# Patient Record
Sex: FEMALE | Race: OTHER | HISPANIC OR LATINO
[De-identification: names, ages, dates, MRNs, and addresses within clinical notes are randomized per-mention and may not be internally consistent; named-entity substitution may affect disease eponyms.]

---

## 2021-01-29 ENCOUNTER — TRANSCRIPTION ENCOUNTER (OUTPATIENT)
Age: 51
End: 2021-01-29

## 2021-01-29 ENCOUNTER — INPATIENT (INPATIENT)
Facility: HOSPITAL | Age: 51
LOS: 1 days | Discharge: HOME | DRG: 585 | End: 2021-01-31
Attending: SURGERY | Admitting: SURGERY
Payer: COMMERCIAL

## 2021-01-29 VITALS
SYSTOLIC BLOOD PRESSURE: 201 MMHG | RESPIRATION RATE: 18 BRPM | OXYGEN SATURATION: 100 % | DIASTOLIC BLOOD PRESSURE: 100 MMHG | HEART RATE: 112 BPM | WEIGHT: 179.9 LBS | HEIGHT: 65 IN | TEMPERATURE: 98 F

## 2021-01-29 DIAGNOSIS — N61.1 ABSCESS OF THE BREAST AND NIPPLE: ICD-10-CM

## 2021-01-29 LAB
ALBUMIN SERPL ELPH-MCNC: 3.7 G/DL — SIGNIFICANT CHANGE UP (ref 3.5–5)
ALP SERPL-CCNC: 83 U/L — SIGNIFICANT CHANGE UP (ref 40–120)
ALT FLD-CCNC: 16 U/L DA — SIGNIFICANT CHANGE UP (ref 10–60)
ANION GAP SERPL CALC-SCNC: 6 MMOL/L — SIGNIFICANT CHANGE UP (ref 5–17)
APPEARANCE UR: CLEAR — SIGNIFICANT CHANGE UP
APTT BLD: 30.5 SEC — SIGNIFICANT CHANGE UP (ref 27.5–35.5)
AST SERPL-CCNC: 5 U/L — LOW (ref 10–40)
BASOPHILS # BLD AUTO: 0.05 K/UL — SIGNIFICANT CHANGE UP (ref 0–0.2)
BASOPHILS NFR BLD AUTO: 0.5 % — SIGNIFICANT CHANGE UP (ref 0–2)
BILIRUB SERPL-MCNC: 0.2 MG/DL — SIGNIFICANT CHANGE UP (ref 0.2–1.2)
BILIRUB UR-MCNC: NEGATIVE — SIGNIFICANT CHANGE UP
BUN SERPL-MCNC: 12 MG/DL — SIGNIFICANT CHANGE UP (ref 7–18)
CALCIUM SERPL-MCNC: 8.4 MG/DL — SIGNIFICANT CHANGE UP (ref 8.4–10.5)
CHLORIDE SERPL-SCNC: 104 MMOL/L — SIGNIFICANT CHANGE UP (ref 96–108)
CK MB BLD-MCNC: <1.6 % — SIGNIFICANT CHANGE UP (ref 0–3.5)
CK MB CFR SERPL CALC: <1 NG/ML — SIGNIFICANT CHANGE UP (ref 0–3.6)
CK SERPL-CCNC: 62 U/L — SIGNIFICANT CHANGE UP (ref 21–215)
CO2 SERPL-SCNC: 28 MMOL/L — SIGNIFICANT CHANGE UP (ref 22–31)
COLOR SPEC: YELLOW — SIGNIFICANT CHANGE UP
CREAT SERPL-MCNC: 0.94 MG/DL — SIGNIFICANT CHANGE UP (ref 0.5–1.3)
DIFF PNL FLD: ABNORMAL
EOSINOPHIL # BLD AUTO: 0.06 K/UL — SIGNIFICANT CHANGE UP (ref 0–0.5)
EOSINOPHIL NFR BLD AUTO: 0.5 % — SIGNIFICANT CHANGE UP (ref 0–6)
GLUCOSE SERPL-MCNC: 133 MG/DL — HIGH (ref 70–99)
GLUCOSE UR QL: NEGATIVE — SIGNIFICANT CHANGE UP
HCT VFR BLD CALC: 36.9 % — SIGNIFICANT CHANGE UP (ref 34.5–45)
HGB BLD-MCNC: 11.8 G/DL — SIGNIFICANT CHANGE UP (ref 11.5–15.5)
IMM GRANULOCYTES NFR BLD AUTO: 0.4 % — SIGNIFICANT CHANGE UP (ref 0–1.5)
INR BLD: 1.09 RATIO — SIGNIFICANT CHANGE UP (ref 0.88–1.16)
KETONES UR-MCNC: NEGATIVE — SIGNIFICANT CHANGE UP
LACTATE SERPL-SCNC: 1.5 MMOL/L — SIGNIFICANT CHANGE UP (ref 0.7–2)
LEUKOCYTE ESTERASE UR-ACNC: NEGATIVE — SIGNIFICANT CHANGE UP
LYMPHOCYTES # BLD AUTO: 0.96 K/UL — LOW (ref 1–3.3)
LYMPHOCYTES # BLD AUTO: 8.7 % — LOW (ref 13–44)
MCHC RBC-ENTMCNC: 28.9 PG — SIGNIFICANT CHANGE UP (ref 27–34)
MCHC RBC-ENTMCNC: 32 GM/DL — SIGNIFICANT CHANGE UP (ref 32–36)
MCV RBC AUTO: 90.2 FL — SIGNIFICANT CHANGE UP (ref 80–100)
MONOCYTES # BLD AUTO: 0.58 K/UL — SIGNIFICANT CHANGE UP (ref 0–0.9)
MONOCYTES NFR BLD AUTO: 5.3 % — SIGNIFICANT CHANGE UP (ref 2–14)
NEUTROPHILS # BLD AUTO: 9.29 K/UL — HIGH (ref 1.8–7.4)
NEUTROPHILS NFR BLD AUTO: 84.6 % — HIGH (ref 43–77)
NITRITE UR-MCNC: NEGATIVE — SIGNIFICANT CHANGE UP
NRBC # BLD: 0 /100 WBCS — SIGNIFICANT CHANGE UP (ref 0–0)
PH UR: 6 — SIGNIFICANT CHANGE UP (ref 5–8)
PLATELET # BLD AUTO: 382 K/UL — SIGNIFICANT CHANGE UP (ref 150–400)
POTASSIUM SERPL-MCNC: 3.5 MMOL/L — SIGNIFICANT CHANGE UP (ref 3.5–5.3)
POTASSIUM SERPL-SCNC: 3.5 MMOL/L — SIGNIFICANT CHANGE UP (ref 3.5–5.3)
PROT SERPL-MCNC: 7.7 G/DL — SIGNIFICANT CHANGE UP (ref 6–8.3)
PROT UR-MCNC: NEGATIVE — SIGNIFICANT CHANGE UP
PROTHROM AB SERPL-ACNC: 12.9 SEC — SIGNIFICANT CHANGE UP (ref 10.6–13.6)
RBC # BLD: 4.09 M/UL — SIGNIFICANT CHANGE UP (ref 3.8–5.2)
RBC # FLD: 13.4 % — SIGNIFICANT CHANGE UP (ref 10.3–14.5)
SARS-COV-2 RNA SPEC QL NAA+PROBE: SIGNIFICANT CHANGE UP
SODIUM SERPL-SCNC: 138 MMOL/L — SIGNIFICANT CHANGE UP (ref 135–145)
SP GR SPEC: 1.01 — SIGNIFICANT CHANGE UP (ref 1.01–1.02)
TROPONIN I SERPL-MCNC: <0.015 NG/ML — SIGNIFICANT CHANGE UP (ref 0–0.04)
UROBILINOGEN FLD QL: NEGATIVE — SIGNIFICANT CHANGE UP
WBC # BLD: 10.98 K/UL — HIGH (ref 3.8–10.5)
WBC # FLD AUTO: 10.98 K/UL — HIGH (ref 3.8–10.5)

## 2021-01-29 PROCEDURE — 99285 EMERGENCY DEPT VISIT HI MDM: CPT

## 2021-01-29 PROCEDURE — 76642 ULTRASOUND BREAST LIMITED: CPT | Mod: 26,LT

## 2021-01-29 PROCEDURE — 99222 1ST HOSP IP/OBS MODERATE 55: CPT | Mod: 57

## 2021-01-29 RX ORDER — MORPHINE SULFATE 50 MG/1
2 CAPSULE, EXTENDED RELEASE ORAL ONCE
Refills: 0 | Status: DISCONTINUED | OUTPATIENT
Start: 2021-01-29 | End: 2021-01-29

## 2021-01-29 RX ORDER — ONDANSETRON 8 MG/1
4 TABLET, FILM COATED ORAL EVERY 6 HOURS
Refills: 0 | Status: DISCONTINUED | OUTPATIENT
Start: 2021-01-29 | End: 2021-01-30

## 2021-01-29 RX ORDER — ACETAMINOPHEN 500 MG
650 TABLET ORAL ONCE
Refills: 0 | Status: COMPLETED | OUTPATIENT
Start: 2021-01-29 | End: 2021-01-29

## 2021-01-29 RX ORDER — LABETALOL HCL 100 MG
5 TABLET ORAL ONCE
Refills: 0 | Status: COMPLETED | OUTPATIENT
Start: 2021-01-29 | End: 2021-01-29

## 2021-01-29 RX ORDER — SODIUM CHLORIDE 9 MG/ML
2000 INJECTION INTRAMUSCULAR; INTRAVENOUS; SUBCUTANEOUS ONCE
Refills: 0 | Status: COMPLETED | OUTPATIENT
Start: 2021-01-29 | End: 2021-01-29

## 2021-01-29 RX ORDER — SODIUM CHLORIDE 9 MG/ML
1000 INJECTION INTRAMUSCULAR; INTRAVENOUS; SUBCUTANEOUS
Refills: 0 | Status: DISCONTINUED | OUTPATIENT
Start: 2021-01-29 | End: 2021-01-30

## 2021-01-29 RX ORDER — LOSARTAN POTASSIUM 100 MG/1
25 TABLET, FILM COATED ORAL DAILY
Refills: 0 | Status: DISCONTINUED | OUTPATIENT
Start: 2021-01-30 | End: 2021-01-30

## 2021-01-29 RX ORDER — OXYCODONE AND ACETAMINOPHEN 5; 325 MG/1; MG/1
1 TABLET ORAL EVERY 6 HOURS
Refills: 0 | Status: DISCONTINUED | OUTPATIENT
Start: 2021-01-29 | End: 2021-01-30

## 2021-01-29 RX ORDER — HEPARIN SODIUM 5000 [USP'U]/ML
5000 INJECTION INTRAVENOUS; SUBCUTANEOUS EVERY 8 HOURS
Refills: 0 | Status: DISCONTINUED | OUTPATIENT
Start: 2021-01-29 | End: 2021-01-30

## 2021-01-29 RX ORDER — KETOROLAC TROMETHAMINE 30 MG/ML
15 SYRINGE (ML) INJECTION EVERY 6 HOURS
Refills: 0 | Status: DISCONTINUED | OUTPATIENT
Start: 2021-01-29 | End: 2021-01-30

## 2021-01-29 RX ORDER — ACETAMINOPHEN 500 MG
650 TABLET ORAL EVERY 6 HOURS
Refills: 0 | Status: DISCONTINUED | OUTPATIENT
Start: 2021-01-29 | End: 2021-01-30

## 2021-01-29 RX ADMIN — OXYCODONE AND ACETAMINOPHEN 1 TABLET(S): 5; 325 TABLET ORAL at 23:35

## 2021-01-29 RX ADMIN — SODIUM CHLORIDE 2000 MILLILITER(S): 9 INJECTION INTRAMUSCULAR; INTRAVENOUS; SUBCUTANEOUS at 20:04

## 2021-01-29 RX ADMIN — SODIUM CHLORIDE 75 MILLILITER(S): 9 INJECTION INTRAMUSCULAR; INTRAVENOUS; SUBCUTANEOUS at 23:38

## 2021-01-29 RX ADMIN — MORPHINE SULFATE 2 MILLIGRAM(S): 50 CAPSULE, EXTENDED RELEASE ORAL at 20:47

## 2021-01-29 RX ADMIN — Medication 650 MILLIGRAM(S): at 20:04

## 2021-01-29 RX ADMIN — HEPARIN SODIUM 5000 UNIT(S): 5000 INJECTION INTRAVENOUS; SUBCUTANEOUS at 23:35

## 2021-01-29 RX ADMIN — Medication 5 MILLIGRAM(S): at 20:43

## 2021-01-29 RX ADMIN — Medication 15 MILLIGRAM(S): at 20:02

## 2021-01-29 NOTE — ED PROVIDER NOTE - PHYSICAL EXAMINATION
SKIN:  Left breast inner lower quadrant5 by 4 fluctuant area with warmth. No induration, very tender, with no surrounding tenderness. Does not get to nipple. SKIN:  Left breast inner lower quadrant5 by 4 fluctuant area with warmth. No induration, very tender, with no surrounding tenderness. Does not get to nipple. (erinn pimentel)

## 2021-01-29 NOTE — H&P ADULT - ATTENDING COMMENTS
recurrent left breast abscess  Needs drainage. Had a long d/w her   I & D , biopsy of the left breast abscess  Informed consent obtained

## 2021-01-29 NOTE — CONSULT NOTE ADULT - SUBJECTIVE AND OBJECTIVE BOX
DA SEQUEIRA  50y  Female      Patient is a 50y old  Female who presents with a chief complaint of Left breast abscess (29 Jan 2021 19:42).  Interval history :: Internal medicine was consulted for Hypertension of 201/100. Pt is anxious and pt was given one dose of Labetalol 5mg.         PAST MEDICAL/SURGICAL HISTORY  PAST MEDICAL & SURGICAL HISTORY:  H/O abscess of breast    Anxiety    HTN (hypertension)    No significant past surgical history        REVIEW OF SYSTEMS:  Patient denies cough, sputum production, fevers/chills/nausea/vomiting. No diarrhea, abdominal pain, headache, palpitations      T(C): 36.9 (01-29-21 @ 18:26), Max: 36.9 (01-29-21 @ 18:26)  HR: 112 (01-29-21 @ 18:26) (112 - 112)  BP: 201/100 (01-29-21 @ 18:26) (201/100 - 201/100)  RR: 18 (01-29-21 @ 18:26) (18 - 18)  SpO2: 100% (01-29-21 @ 18:26) (100% - 100%)  Wt(kg): --Vital Signs Last 24 Hrs  T(C): 36.9 (29 Jan 2021 18:26), Max: 36.9 (29 Jan 2021 18:26)  T(F): 98.5 (29 Jan 2021 18:26), Max: 98.5 (29 Jan 2021 18:26)  HR: 112 (29 Jan 2021 18:26) (112 - 112)  BP: 201/100 (29 Jan 2021 18:26) (201/100 - 201/100)  BP(mean): --  RR: 18 (29 Jan 2021 18:26) (18 - 18)  SpO2: 100% (29 Jan 2021 18:26) (100% - 100%)    PHYSICAL EXAM:  GENERAL: NAD, well-groomed, well-developed  HEAD:  Atraumatic, Normocephalic  EYES: EOMI, PERRLA, conjunctiva and sclera clear  ENT: No tonsillar erythema, exudates, or enlargement; Moist mucous membranes, Good dentition, No lesions  NECK: Supple, No JVD, Normal thyroid  NERVOUS SYSTEM:  Alert & Oriented X3, Good concentration; Motor Strength 5/5 B/L upper and lower extremities; DTRs 2+ intact and symmetric  CHEST/LUNG: Clear to percussion bilaterally; No rales, rhonchi, wheezing, or rubs  HEART: Regular rate and rhythm; No murmurs, rubs, or gallops  ABDOMEN: Soft, Nontender, Nondistended; Bowel sounds present  EXTREMITIES:  2+ Peripheral Pulses, No clubbing, cyanosis, or edema  LYMPH: No lymphadenopathy noted  SKIN: Left breast has redness, warmth, tenderness and fluctuant.     Consultant(s) Notes Reviewed:  [x ] YES  [ ] NO  Care Discussed with Consultants/Other Providers [ x] YES  [ ] NO    LABS:  CBC   01-29-21 @ 19:59  Hematcorit 36.9  Hemoglobin 11.8  Mean Cell Hemoglobin 28.9  Platelet Count-Automated 382  RBC Count 4.09  Red Cell Distrib Width 13.4  Wbc Count 10.98      BMP      CMP      PT/INR  PT/INR  01-29-21 @ 19:59  INR 1.09  Prothrombin Time Comment --  Prothrobin Time, Xropml37.9      Amylase/Lipase            RADIOLOGY & ADDITIONAL TESTS:    Imaging Personally Reviewed:  [ ] YES  [ ] NO

## 2021-01-29 NOTE — ED ADULT NURSE NOTE - NSIMPLEMENTINTERV_GEN_ALL_ED
Implemented All Universal Safety Interventions:  [place] to call system. Call bell, personal items and telephone within reach. Instruct patient to call for assistance. Room bathroom lighting operational. Non-slip footwear when patient is off stretcher. Physically safe environment: no spills, clutter or unnecessary equipment. Stretcher in lowest position, wheels locked, appropriate side rails in place.

## 2021-01-29 NOTE — ED PROVIDER NOTE - OBJECTIVE STATEMENT
49 y/o F with a significant PMHx of HTN, anxiety presents to the ED c/o abscess to left breast. Patient endorses pain, swelling and redness. Patient states she had a abscess at the same spot x10 years ago, and because of this had a mammogram in November and ultrasound in October. Patient states she went to [facility] and was sent to the ED for further evaluation. Denies any fever, chills, or any other complaints. Allergies: Keflex.

## 2021-01-29 NOTE — CONSULT NOTE ADULT - ASSESSMENT
49yo F with PMH HTN (no meds/not seen by MD since pandemic), generalized anxiety disorder, reactive airway disease (no meds) L breast abscess 10 years ago, c/o pain, redness and swelling to L breast that began 5 days ago.    #Hypertensive urgency   Pt was on Metoprolol and Losartan 1 yr ago and discontinued 1 yr back. Currently not on any medications.   BP of 200/100  Will give one dose of Labetalol 5mg   Started on Losartan 25mg   Will monitor for now     # Sinus Tachycardia of 100  EKG showed Sinus tachycardia.  Cardiac workup like ECHO and NST was normal 2 yrs back  f/u Troponin   f/u ECHO for LVH     #Breast abscess  u/s showed breast abscess  s/p 10ml of pus drained from abscess at the bedside    Preoperative evaluation- Patient without clinical evidence of active cardiac condition, Bartlett score 0, RCRI is 0, METS>4,  patient is low risk for low risk procedure with non modifiable risk factors. No further testing warranted prior to procedure.

## 2021-01-29 NOTE — H&P ADULT - BREASTS DETAILS
L breast lower inner quadrant with erythema and tenderness. Area of induration with puckering of skin, area of fluctuance inferior to puckering/nipples normal/mass L/tenderness L

## 2021-01-29 NOTE — H&P ADULT - HISTORY OF PRESENT ILLNESS
51yo F with PMH HTN (no meds/not seen by MD since pandemic), generalized anxiety disorder, reactive airway disease (no meds) L breast abscess 10 years ago, c/o pain, redness and swelling to L breast that began 5 days ago.  Pt states pain became progressively worse prompting her to come to ED.  Pt states she had a mammogram 6 months ago then an US after for further diagnosis both showed scarring from previous abscess?.  States never had any history of breast mass or cancer.  No family history. Denies fever, chills, nausea or vomiting.

## 2021-01-29 NOTE — H&P ADULT - ASSESSMENT
Left breast abscess  1. Admit to surgery  2. IV abx  3. Reg diet tonight, NPO after midnight  4. Pain control  5. Aspiration of left breast   6. Possible OR in AM  7. AM labs  8. Case discussed with Dr. Webb and agreed Left breast abscess. Hypertensive  1. Admit to surgery  2. Medicine consult for BP control  3. IV abx  4. Reg diet tonight, NPO after midnight  5. Pain control  6. Aspiration of left breast   7. Possible OR in AM  8. AM labs  9. Case discussed with Dr. Webb and agreed

## 2021-01-30 ENCOUNTER — RESULT REVIEW (OUTPATIENT)
Age: 51
End: 2021-01-30

## 2021-01-30 LAB
BLD GP AB SCN SERPL QL: SIGNIFICANT CHANGE UP
CK MB BLD-MCNC: <1.4 % — SIGNIFICANT CHANGE UP (ref 0–3.5)
CK MB CFR SERPL CALC: <1 NG/ML — SIGNIFICANT CHANGE UP (ref 0–3.6)
CK SERPL-CCNC: 69 U/L — SIGNIFICANT CHANGE UP (ref 21–215)
CULTURE RESULTS: SIGNIFICANT CHANGE UP
HCG UR QL: NEGATIVE — SIGNIFICANT CHANGE UP
SPECIMEN SOURCE: SIGNIFICANT CHANGE UP
TROPONIN I SERPL-MCNC: <0.015 NG/ML — SIGNIFICANT CHANGE UP (ref 0–0.04)

## 2021-01-30 PROCEDURE — ZZZZZ: CPT

## 2021-01-30 PROCEDURE — 19020 MASTOTOMY EXPL DRG ABSC DP: CPT

## 2021-01-30 PROCEDURE — 88305 TISSUE EXAM BY PATHOLOGIST: CPT | Mod: 26

## 2021-01-30 RX ORDER — SODIUM CHLORIDE 9 MG/ML
1000 INJECTION, SOLUTION INTRAVENOUS
Refills: 0 | Status: DISCONTINUED | OUTPATIENT
Start: 2021-01-30 | End: 2021-01-30

## 2021-01-30 RX ORDER — HYDROMORPHONE HYDROCHLORIDE 2 MG/ML
0.5 INJECTION INTRAMUSCULAR; INTRAVENOUS; SUBCUTANEOUS
Refills: 0 | Status: DISCONTINUED | OUTPATIENT
Start: 2021-01-30 | End: 2021-01-30

## 2021-01-30 RX ORDER — ALPRAZOLAM 0.25 MG
0.5 TABLET ORAL ONCE
Refills: 0 | Status: DISCONTINUED | OUTPATIENT
Start: 2021-01-30 | End: 2021-01-30

## 2021-01-30 RX ORDER — LABETALOL HCL 100 MG
10 TABLET ORAL ONCE
Refills: 0 | Status: COMPLETED | OUTPATIENT
Start: 2021-01-30 | End: 2021-01-30

## 2021-01-30 RX ORDER — ACETAMINOPHEN 500 MG
650 TABLET ORAL EVERY 6 HOURS
Refills: 0 | Status: DISCONTINUED | OUTPATIENT
Start: 2021-01-30 | End: 2021-01-31

## 2021-01-30 RX ORDER — LOSARTAN POTASSIUM 100 MG/1
50 TABLET, FILM COATED ORAL DAILY
Refills: 0 | Status: DISCONTINUED | OUTPATIENT
Start: 2021-01-30 | End: 2021-01-31

## 2021-01-30 RX ORDER — HEPARIN SODIUM 5000 [USP'U]/ML
5000 INJECTION INTRAVENOUS; SUBCUTANEOUS EVERY 8 HOURS
Refills: 0 | Status: DISCONTINUED | OUTPATIENT
Start: 2021-01-30 | End: 2021-01-31

## 2021-01-30 RX ORDER — SIMETHICONE 80 MG/1
80 TABLET, CHEWABLE ORAL ONCE
Refills: 0 | Status: COMPLETED | OUTPATIENT
Start: 2021-01-30 | End: 2021-01-30

## 2021-01-30 RX ORDER — LOSARTAN POTASSIUM 100 MG/1
25 TABLET, FILM COATED ORAL DAILY
Refills: 0 | Status: DISCONTINUED | OUTPATIENT
Start: 2021-01-30 | End: 2021-01-30

## 2021-01-30 RX ORDER — KETOROLAC TROMETHAMINE 30 MG/ML
30 SYRINGE (ML) INJECTION EVERY 6 HOURS
Refills: 0 | Status: DISCONTINUED | OUTPATIENT
Start: 2021-01-30 | End: 2021-01-31

## 2021-01-30 RX ORDER — METOCLOPRAMIDE HCL 10 MG
10 TABLET ORAL ONCE
Refills: 0 | Status: DISCONTINUED | OUTPATIENT
Start: 2021-01-30 | End: 2021-01-30

## 2021-01-30 RX ADMIN — LOSARTAN POTASSIUM 25 MILLIGRAM(S): 100 TABLET, FILM COATED ORAL at 06:15

## 2021-01-30 RX ADMIN — HEPARIN SODIUM 5000 UNIT(S): 5000 INJECTION INTRAVENOUS; SUBCUTANEOUS at 21:39

## 2021-01-30 RX ADMIN — Medication 10 MILLIGRAM(S): at 22:17

## 2021-01-30 RX ADMIN — Medication 0.5 MILLIGRAM(S): at 21:39

## 2021-01-30 RX ADMIN — Medication 15 MILLIGRAM(S): at 09:36

## 2021-01-30 RX ADMIN — Medication 110 MILLIGRAM(S): at 06:15

## 2021-01-30 RX ADMIN — Medication 30 MILLIGRAM(S): at 23:02

## 2021-01-30 RX ADMIN — LOSARTAN POTASSIUM 25 MILLIGRAM(S): 100 TABLET, FILM COATED ORAL at 18:14

## 2021-01-30 RX ADMIN — SIMETHICONE 80 MILLIGRAM(S): 80 TABLET, CHEWABLE ORAL at 10:48

## 2021-01-30 RX ADMIN — Medication 110 MILLIGRAM(S): at 17:51

## 2021-01-30 RX ADMIN — Medication 30 MILLIGRAM(S): at 17:51

## 2021-01-30 RX ADMIN — Medication 650 MILLIGRAM(S): at 06:16

## 2021-01-30 RX ADMIN — Medication 10 MILLIGRAM(S): at 11:46

## 2021-01-30 RX ADMIN — HEPARIN SODIUM 5000 UNIT(S): 5000 INJECTION INTRAVENOUS; SUBCUTANEOUS at 06:16

## 2021-01-30 RX ADMIN — SODIUM CHLORIDE 75 MILLILITER(S): 9 INJECTION INTRAMUSCULAR; INTRAVENOUS; SUBCUTANEOUS at 06:15

## 2021-01-30 NOTE — PRE-OP CHECKLIST - SELECT TESTS ORDERED
BMP/CBC/PT/PTT/INR/Hepatic Function/Type and Cross/Type and Screen/Urinalysis/CXR/POCT Blood Glucose/COVID

## 2021-01-30 NOTE — CHART NOTE - NSCHARTNOTEFT_GEN_A_CORE
Pt POD 0 s/p I&D L breast abscess  resting comfortably  no n/v  kyler PO  voided    Vital Signs Last 24 Hrs  T(C): 36.2 (30 Jan 2021 15:45), Max: 36.9 (29 Jan 2021 18:26)  T(F): 97.1 (30 Jan 2021 15:45), Max: 98.5 (29 Jan 2021 18:26)  HR: 81 (30 Jan 2021 15:45) (60 - 116)  BP: 142/75 (30 Jan 2021 15:45) (108/68 - 201/100)  BP(mean): 87 (30 Jan 2021 15:05) (74 - 87)  RR: 18 (30 Jan 2021 15:45) (13 - 18)  SpO2: 100% (30 Jan 2021 15:45) (97% - 100%)    L breast dsg CDI    stable post-op

## 2021-01-31 ENCOUNTER — TRANSCRIPTION ENCOUNTER (OUTPATIENT)
Age: 51
End: 2021-01-31

## 2021-01-31 VITALS
DIASTOLIC BLOOD PRESSURE: 85 MMHG | TEMPERATURE: 98 F | RESPIRATION RATE: 18 BRPM | SYSTOLIC BLOOD PRESSURE: 148 MMHG | OXYGEN SATURATION: 100 % | HEART RATE: 87 BPM

## 2021-01-31 LAB
BLD GP AB SCN SERPL QL: SIGNIFICANT CHANGE UP
HCT VFR BLD CALC: 33.5 % — LOW (ref 34.5–45)
HGB BLD-MCNC: 10.8 G/DL — LOW (ref 11.5–15.5)
INR BLD: 1.09 RATIO — SIGNIFICANT CHANGE UP (ref 0.88–1.16)
MCHC RBC-ENTMCNC: 29.4 PG — SIGNIFICANT CHANGE UP (ref 27–34)
MCHC RBC-ENTMCNC: 32.2 GM/DL — SIGNIFICANT CHANGE UP (ref 32–36)
MCV RBC AUTO: 91.3 FL — SIGNIFICANT CHANGE UP (ref 80–100)
NRBC # BLD: 0 /100 WBCS — SIGNIFICANT CHANGE UP (ref 0–0)
PLATELET # BLD AUTO: 338 K/UL — SIGNIFICANT CHANGE UP (ref 150–400)
PROTHROM AB SERPL-ACNC: 12.9 SEC — SIGNIFICANT CHANGE UP (ref 10.6–13.6)
RBC # BLD: 3.67 M/UL — LOW (ref 3.8–5.2)
RBC # FLD: 13.6 % — SIGNIFICANT CHANGE UP (ref 10.3–14.5)
WBC # BLD: 7.45 K/UL — SIGNIFICANT CHANGE UP (ref 3.8–10.5)
WBC # FLD AUTO: 7.45 K/UL — SIGNIFICANT CHANGE UP (ref 3.8–10.5)

## 2021-01-31 PROCEDURE — 87040 BLOOD CULTURE FOR BACTERIA: CPT

## 2021-01-31 PROCEDURE — 87075 CULTR BACTERIA EXCEPT BLOOD: CPT

## 2021-01-31 PROCEDURE — 82550 ASSAY OF CK (CPK): CPT

## 2021-01-31 PROCEDURE — 76642 ULTRASOUND BREAST LIMITED: CPT

## 2021-01-31 PROCEDURE — 85730 THROMBOPLASTIN TIME PARTIAL: CPT

## 2021-01-31 PROCEDURE — 93005 ELECTROCARDIOGRAM TRACING: CPT

## 2021-01-31 PROCEDURE — U0005: CPT

## 2021-01-31 PROCEDURE — 86901 BLOOD TYPING SEROLOGIC RH(D): CPT

## 2021-01-31 PROCEDURE — 85027 COMPLETE CBC AUTOMATED: CPT

## 2021-01-31 PROCEDURE — 87077 CULTURE AEROBIC IDENTIFY: CPT

## 2021-01-31 PROCEDURE — 87086 URINE CULTURE/COLONY COUNT: CPT

## 2021-01-31 PROCEDURE — 85025 COMPLETE CBC W/AUTO DIFF WBC: CPT

## 2021-01-31 PROCEDURE — 82553 CREATINE MB FRACTION: CPT

## 2021-01-31 PROCEDURE — 85610 PROTHROMBIN TIME: CPT

## 2021-01-31 PROCEDURE — 87070 CULTURE OTHR SPECIMN AEROBIC: CPT

## 2021-01-31 PROCEDURE — 93306 TTE W/DOPPLER COMPLETE: CPT

## 2021-01-31 PROCEDURE — 88305 TISSUE EXAM BY PATHOLOGIST: CPT

## 2021-01-31 PROCEDURE — 99285 EMERGENCY DEPT VISIT HI MDM: CPT | Mod: 25

## 2021-01-31 PROCEDURE — 86850 RBC ANTIBODY SCREEN: CPT

## 2021-01-31 PROCEDURE — 83605 ASSAY OF LACTIC ACID: CPT

## 2021-01-31 PROCEDURE — 81001 URINALYSIS AUTO W/SCOPE: CPT

## 2021-01-31 PROCEDURE — 81025 URINE PREGNANCY TEST: CPT

## 2021-01-31 PROCEDURE — 80053 COMPREHEN METABOLIC PANEL: CPT

## 2021-01-31 PROCEDURE — 87635 SARS-COV-2 COVID-19 AMP PRB: CPT

## 2021-01-31 PROCEDURE — 86900 BLOOD TYPING SEROLOGIC ABO: CPT

## 2021-01-31 PROCEDURE — 87205 SMEAR GRAM STAIN: CPT

## 2021-01-31 PROCEDURE — 36415 COLL VENOUS BLD VENIPUNCTURE: CPT

## 2021-01-31 PROCEDURE — 84484 ASSAY OF TROPONIN QUANT: CPT

## 2021-01-31 RX ORDER — AMLODIPINE BESYLATE 2.5 MG/1
5 TABLET ORAL DAILY
Refills: 0 | Status: DISCONTINUED | OUTPATIENT
Start: 2021-01-31 | End: 2021-01-31

## 2021-01-31 RX ORDER — LOSARTAN POTASSIUM 100 MG/1
1 TABLET, FILM COATED ORAL
Qty: 14 | Refills: 0
Start: 2021-01-31

## 2021-01-31 RX ORDER — AMLODIPINE BESYLATE 2.5 MG/1
1 TABLET ORAL
Qty: 14 | Refills: 0
Start: 2021-01-31

## 2021-01-31 RX ORDER — KETOROLAC TROMETHAMINE 30 MG/ML
1 SYRINGE (ML) INJECTION
Qty: 4 | Refills: 0
Start: 2021-01-31 | End: 2021-02-04

## 2021-01-31 RX ADMIN — HEPARIN SODIUM 5000 UNIT(S): 5000 INJECTION INTRAVENOUS; SUBCUTANEOUS at 05:40

## 2021-01-31 RX ADMIN — LOSARTAN POTASSIUM 50 MILLIGRAM(S): 100 TABLET, FILM COATED ORAL at 05:40

## 2021-01-31 RX ADMIN — Medication 30 MILLIGRAM(S): at 05:41

## 2021-01-31 RX ADMIN — Medication 110 MILLIGRAM(S): at 05:40

## 2021-01-31 RX ADMIN — Medication 650 MILLIGRAM(S): at 10:12

## 2021-01-31 NOTE — DISCHARGE NOTE NURSING/CASE MANAGEMENT/SOCIAL WORK - PATIENT PORTAL LINK FT
You can access the FollowMyHealth Patient Portal offered by [facility] by registering at the following website: [email or web address]. By joining [facility]’s FollowMyHealth portal, you will also be able to view your health information using other applications (apps) compatible with our system.

## 2021-01-31 NOTE — DISCHARGE NOTE PROVIDER - HOSPITAL COURSE
50 y.o. F with PMH HTN, L breast abscess 10 years ago presents to [facility] ER c/o pain and swelling of L breast. Abscess aspirated in ER and pt admitted on abx. L breast was swollen again next day and pt taken to OR for formal I&D. Copious amount of pus drained. Pt stable post operatively and discharged POD#1 on abx

## 2021-01-31 NOTE — PROGRESS NOTE ADULT - ASSESSMENT
50y.o. Female s/p I&D L breast abscess POD#1    -Dressing change and wound repacked  -d/c home today with abx  -Outpt f/u with Dr. Webb 2/4/2021
49yo F with PMH HTN (no meds/not seen by MD since pandemic), generalized anxiety disorder, reactive airway disease (no meds) L breast abscess 10 years ago, c/o pain, redness and swelling to L breast that began 5 days ago.    #Hypertensive urgency   Pt was on losartan and previously on metoprolol that has been discontinued  BP persistently elevated  s/p labetalol push and xanax, BP improved  Currently pain 5/10  Will increase losartan to 50mg daily  Monitor BP and if remain elevated consider adding calcium channel blockers/diuretics to ACE inh  Pt advised for close pcp follow up on discharge

## 2021-01-31 NOTE — PROGRESS NOTE ADULT - SUBJECTIVE AND OBJECTIVE BOX
INTERVAL HPI/OVERNIGHT EVENTS:  Pt resting comfortably. No acute complaints.   Tolerating pain with meds.     MEDICATIONS  (STANDING):  amLODIPine   Tablet 5 milliGRAM(s) Oral daily  doxycycline IVPB 100 milliGRAM(s) IV Intermittent every 12 hours  heparin   Injectable 5000 Unit(s) SubCutaneous every 8 hours  ketorolac   Injectable 30 milliGRAM(s) IV Push every 6 hours  losartan 50 milliGRAM(s) Oral daily    MEDICATIONS  (PRN):  acetaminophen   Tablet .. 650 milliGRAM(s) Oral every 6 hours PRN Temp greater or equal to 38C (100.4F), Mild Pain (1 - 3)      Vital Signs Last 24 Hrs  T(C): 36.8 (31 Jan 2021 05:30), Max: 36.8 (31 Jan 2021 05:30)  T(F): 98.3 (31 Jan 2021 05:30), Max: 98.3 (31 Jan 2021 05:30)  HR: 87 (31 Jan 2021 05:30) (60 - 102)  BP: 148/85 (31 Jan 2021 05:30) (108/68 - 210/101)  BP(mean): 87 (30 Jan 2021 15:05) (74 - 87)  RR: 18 (31 Jan 2021 05:30) (13 - 18)  SpO2: 100% (31 Jan 2021 05:30) (97% - 100%)    Physical:  General: A&Ox3. NAD.  Chest: Medial left chest wound open, clean, dry. No active drainage or bleeding noted. Mild surrounding erythema stable.    LABS:                        10.8   7.45  )-----------( 338      ( 31 Jan 2021 06:27 )             33.5             01-29    138  |  104  |  12  ----------------------------<  133<H>  3.5   |  28  |  0.94    Ca    8.4      29 Jan 2021 19:59    TPro  7.7  /  Alb  3.7  /  TBili  0.2  /  DBili  x   /  AST  5<L>  /  ALT  16  /  AlkPhos  83  01-29    Specimen Source: .Abscess Left breast abscess (01.30.21 @ 03:57)    
DA BARBOSANTE  --------------------------------------------------  Overnight Events: Pt continues to remain hypertensive, s/p multiple pushes of IV labetalol. BP now improved.     HPI:  49yo F with PMH HTN (no meds/not seen by MD since pandemic), generalized anxiety disorder, reactive airway disease (no meds) L breast abscess 10 years ago, c/o pain, redness and swelling to L breast that began 5 days ago.  Pt states pain became progressively worse prompting her to come to ED.  Pt states she had a mammogram 6 months ago then an US after for further diagnosis both showed scarring from previous abscess?.  States never had any history of breast mass or cancer.  No family history. Denies fever, chills, nausea or vomiting.  (2021 19:42)    PAST MEDICAL & SURGICAL HISTORY:  H/O abscess of breast    Anxiety    HTN (hypertension)    No significant past surgical history      FAMILY HISTORY:    REVIEW OF SYSTEMS: All negative except the one in documented in HPI  ----------------------------------  MEDICATIONS  (STANDING):  doxycycline IVPB 100 milliGRAM(s) IV Intermittent every 12 hours  heparin   Injectable 5000 Unit(s) SubCutaneous every 8 hours  ketorolac   Injectable 30 milliGRAM(s) IV Push every 6 hours  losartan 25 milliGRAM(s) Oral daily    MEDICATIONS  (PRN):  acetaminophen   Tablet .. 650 milliGRAM(s) Oral every 6 hours PRN Temp greater or equal to 38C (100.4F), Mild Pain (1 - 3)    Allergies    Keflex (Hives)    Intolerances      Vital Signs Last 24 Hrs  T(C): 36.7 (2021 21:01), Max: 36.9 (2021 10:13)  T(F): 98.1 (2021 21:01), Max: 98.5 (2021 10:13)  HR: 80 (2021 23:05) (60 - 108)  BP: 125/68 (2021 23:05) (108/68 - 210/101)  BP(mean): 87 (2021 15:05) (74 - 87)  RR: 18 (2021 21:01) (13 - 18)  SpO2: 100% (2021 21:01) (97% - 100%)    PHYSICAL EXAMINATION:  General: Well developed. well nourished. not in distress  HEENT: AT, NC. PERRL. intact EOM. no throat erythema or exudate.   Neck: supple. no JVD. no palpable lymph nodes  Chest: CTA bilaterally  Heart: normal S1,S2. RRR. no heart murmur.  Abdomen: soft. non-tender. non-distended. + BS. no hernia or palpable masses.  Genital: not examined  Ext: no C/C/E. no calf tenderness.  Neuro: AAO x3. no focal weakness. no speech disorder  Skin: no rash  ---------------------------------------    LABS:  --------                        11.8   10.98 )-----------( 382      ( 2021 19:59 )             36.9     01-29    138  |  104  |  12  ----------------------------<  133<H>  3.5   |  28  |  0.94    Ca    8.4      2021 19:59    TPro  7.7  /  Alb  3.7  /  TBili  0.2  /  DBili  x   /  AST  5<L>  /  ALT  16  /  AlkPhos  83  -29    PT/INR - ( 2021 19:59 )   PT: 12.9 sec;   INR: 1.09 ratio         PTT - ( 2021 19:59 )  PTT:30.5 sec  Urinalysis Basic - ( 2021 21:13 )    Color: Yellow / Appearance: Clear / S.010 / pH: x  Gluc: x / Ketone: Negative  / Bili: Negative / Urobili: Negative   Blood: x / Protein: Negative / Nitrite: Negative   Leuk Esterase: Negative / RBC: 5-10 /HPF / WBC 0-2 /HPF   Sq Epi: x / Non Sq Epi: Few /HPF / Bacteria: Few /HPF

## 2021-01-31 NOTE — DISCHARGE NOTE PROVIDER - NSDCCPCAREPLAN_GEN_ALL_CORE_FT
PRINCIPAL DISCHARGE DIAGNOSIS  Diagnosis: Breast abscess  Assessment and Plan of Treatment: May shower with soap and warm water. Remove dressing and packing before showering. Pat area dry after. Repack wound with wet gauze. Cover with dry dressing.   Complete antibiotics course  Follow up with Dr. Webb 2/4/2021 in office.

## 2021-01-31 NOTE — DISCHARGE NOTE PROVIDER - NSDCCPTREATMENT_GEN_ALL_CORE_FT
PRINCIPAL PROCEDURE  Procedure: Incision and drainage of abscess of left breast  Findings and Treatment:

## 2021-01-31 NOTE — DISCHARGE NOTE PROVIDER - NSDCMRMEDTOKEN_GEN_ALL_CORE_FT
Augmentin 875 mg-125 mg oral tablet: 1 tab(s) orally every 12 hours   ketorolac 10 mg oral tablet: 1 tab(s) orally 4 times a day, As Needed -for moderate pain MDD:10

## 2021-01-31 NOTE — DISCHARGE NOTE PROVIDER - CARE PROVIDER_API CALL
Jim Webb (MD)  Surgery  [address] [place] [id number]  Phone: [phone]  Fax: [phone]  Follow Up Time:

## 2021-02-01 LAB
CULTURE RESULTS: SIGNIFICANT CHANGE UP
CULTURE RESULTS: SIGNIFICANT CHANGE UP
SPECIMEN SOURCE: SIGNIFICANT CHANGE UP
SPECIMEN SOURCE: SIGNIFICANT CHANGE UP

## 2021-02-04 ENCOUNTER — APPOINTMENT (OUTPATIENT)
Dept: SURGERY | Facility: CLINIC | Age: 51
End: 2021-02-04
Payer: COMMERCIAL

## 2021-02-04 DIAGNOSIS — K21.9 GASTRO-ESOPHAGEAL REFLUX DISEASE W/OUT ESOPHAGITIS: ICD-10-CM

## 2021-02-04 DIAGNOSIS — I10 ESSENTIAL (PRIMARY) HYPERTENSION: ICD-10-CM

## 2021-02-04 DIAGNOSIS — Z78.9 OTHER SPECIFIED HEALTH STATUS: ICD-10-CM

## 2021-02-04 DIAGNOSIS — Z63.5 DISRUPTION OF FAMILY BY SEPARATION AND DIVORCE: ICD-10-CM

## 2021-02-04 DIAGNOSIS — F41.9 ANXIETY DISORDER, UNSPECIFIED: ICD-10-CM

## 2021-02-04 DIAGNOSIS — J45.909 UNSPECIFIED ASTHMA, UNCOMPLICATED: ICD-10-CM

## 2021-02-04 PROBLEM — Z00.00 ENCOUNTER FOR PREVENTIVE HEALTH EXAMINATION: Status: ACTIVE | Noted: 2021-02-04

## 2021-02-04 PROBLEM — Z87.2 PERSONAL HISTORY OF DISEASES OF THE SKIN AND SUBCUTANEOUS TISSUE: Chronic | Status: ACTIVE | Noted: 2021-01-29

## 2021-02-04 LAB — SURGICAL PATHOLOGY STUDY: SIGNIFICANT CHANGE UP

## 2021-02-04 PROCEDURE — 99024 POSTOP FOLLOW-UP VISIT: CPT

## 2021-02-04 RX ORDER — AMLODIPINE BESYLATE 5 MG/1
TABLET ORAL
Refills: 0 | Status: ACTIVE | COMMUNITY

## 2021-02-04 RX ORDER — AMLODIPINE BESYLATE 5 MG/1
5 TABLET ORAL DAILY
Qty: 14 | Refills: 0 | Status: ACTIVE | COMMUNITY
Start: 2021-02-04 | End: 1900-01-01

## 2021-02-04 RX ORDER — LOSARTAN POTASSIUM 50 MG/1
50 TABLET, FILM COATED ORAL DAILY
Qty: 14 | Refills: 0 | Status: ACTIVE | COMMUNITY
Start: 2021-02-04 | End: 1900-01-01

## 2021-02-04 RX ORDER — LOSARTAN POTASSIUM 100 MG/1
TABLET, FILM COATED ORAL
Refills: 0 | Status: ACTIVE | COMMUNITY

## 2021-02-04 RX ORDER — AMOXICILLIN 875 MG/1
TABLET, FILM COATED ORAL
Refills: 0 | Status: ACTIVE | COMMUNITY

## 2021-02-04 SDOH — SOCIAL STABILITY - SOCIAL INSECURITY: DISRUPTION OF FAMILY BY SEPARATION AND DIVORCE: Z63.5

## 2021-02-04 NOTE — HISTORY OF PRESENT ILLNESS
[de-identified] : DA SEQUEIRA presents to the office for postoperative visit today, she is s/P I&D, debridement of left breast abscess with breast biopsy, 01/30/21. Patient without reported complaints today, stating she is feeling better.

## 2021-02-04 NOTE — ASSESSMENT
[FreeTextEntry1] : DA SEQUEIRA presents to the office for postoperative visit today, she is s/P I&D, debridement of left breast abscess with breast biopsy, 01/30/21. Patient without reported complaints today, stating she is feeling better. She's been packing the wound and keeping dressing over it. \par \par Incision site is healing appropriately, wound was re-packed and dressing placed.

## 2021-02-04 NOTE — REVIEW OF SYSTEMS
[Fever] : no fever [Chills] : no chills [Feeling Poorly] : not feeling poorly [Chest Pain] : no chest pain [Shortness Of Breath] : no shortness of breath breath sounds clear and equal bilaterally. [Cough] : no cough [Abdominal Pain] : no abdominal pain [Pelvic Pain] : no pelvic pain [Arthralgias] : no arthralgias [Dizziness] : no dizziness [Anxiety] : no anxiety [Muscle Weakness] : no muscle weakness [Swollen Glands] : no swollen glands [de-identified] : S/P I&D, left breast abscess

## 2021-02-04 NOTE — REASON FOR VISIT
[Post Op: _________] : a [unfilled] post op visit [FreeTextEntry1] : s/P I&D, debridement of left breast abscess with breast biopsy, 01/30/21

## 2021-02-04 NOTE — PHYSICAL EXAM
[Normal Breath Sounds] : Normal breath sounds [Normal Rate and Rhythm] : normal rate and rhythm [No Rash or Lesion] : No rash or lesion [Alert] : alert [Oriented to Person] : oriented to person [Oriented to Place] : oriented to place [Oriented to Time] : oriented to time [Calm] : calm [JVD] : no jugular venous distention  [de-identified] : A/Ox3; NAD. appears comfortable [de-identified] : EOMI; sclera anicteric. Nasal mucosa pink, septum midline. Oral mucosa pink. Tongue midline, Pharynx without exudates. [de-identified] : wound to left breast is healing appropriately, no evidence of pustular drainage or bleeding  [de-identified] : +ROM, no joint swelling [de-identified] : as noted above..

## 2021-02-04 NOTE — PLAN
[FreeTextEntry1] : continue with current wound care \par follow up in the office in 2 weeks if wound does not heal/call with concerns \par \par Patient's questions and concerns addressed to their satisfaction, and patient verbalized an understanding of the information discussed.\par

## 2025-05-13 ENCOUNTER — NON-APPOINTMENT (OUTPATIENT)
Age: 55
End: 2025-05-13

## 2025-05-15 ENCOUNTER — APPOINTMENT (OUTPATIENT)
Dept: SURGERY | Facility: CLINIC | Age: 55
End: 2025-05-15
Payer: COMMERCIAL

## 2025-05-15 ENCOUNTER — TRANSCRIPTION ENCOUNTER (OUTPATIENT)
Age: 55
End: 2025-05-15

## 2025-05-15 VITALS
BODY MASS INDEX: 29.99 KG/M2 | SYSTOLIC BLOOD PRESSURE: 170 MMHG | HEIGHT: 65 IN | DIASTOLIC BLOOD PRESSURE: 80 MMHG | HEART RATE: 106 BPM | WEIGHT: 180 LBS

## 2025-05-15 DIAGNOSIS — N63.20 UNSPECIFIED LUMP IN THE LEFT BREAST, UNSPECIFIED QUADRANT: ICD-10-CM

## 2025-05-15 PROCEDURE — 99203 OFFICE O/P NEW LOW 30 MIN: CPT

## 2025-06-02 NOTE — PRE PROCEDURE NOTE - PRE PROCEDURE EVALUATION
Name:DA SEQUEIRAMRN:[id number] Date:May 15 2025    8:30AMDOB:[id number] By:WILLIE ROBIN TDocumented Status:FinalReason For VisitNANCY HUA is a 54 year old female being seen for a consultation visit, breast evaluation. History of Present IllnessMs. HUA is a 54 year y/o F presents to the office today for evaluation of nodular mass to the L inner breast. She's noticed some discharge from the area in the past. History of I&D of L breast abscess w breast biopsy 01/30/2021, which was benign. Patient w PMH HTN (on oral meds), allergies. No other reported medical history. Active ProblemsAnxiety (300.00) (F41.9)Asthma (493.90) (J45.909)GERD (gastroesophageal reflux disease) (530.81) (K21.9)High blood pressure (401.9) (I10)Surgical HistoryHistory of Uterine MyomectomySocial HistoryDivorced (V61.03) (Z63.5)EmployedNo alcohol useNo illicit drug useNon-smoker (V49.89) (Z78.9)Current MedsamLODIPine Besylate 5 MG Oral Tablet; TAKE 1 TABLET DAILY FOR BLOOD PRESSUREamLODIPine Besylate TABSAmoxicillin TABSLosartan Potassium 50 MG Oral Tablet; TAKE 1 TABLET DAILYLosartan Potassium TABSAllergiesKeflex TABSReview of SystemsConstitutional, Eyes, ENT, Cardiovascular, Respiratory, Gastrointestinal, Genitourinary, Musculoskeletal, Integumentary, Neurological, Psychiatric, Endocrine and Heme/Lymph are otherwise negative.           Data ReviewedPatient: Howard SEQUEIRAte of Birth: [id number]: [phone]: [id number] Acc: [id number] of Exam: [id number]: ULTRASOUND BREAST BILATERAL COMPLETEHISTORY: The patient is 54 years old and is seen for Follow-up sonogram for previous abnormal exam. TECHNIQUE: A bilateral breast ultrasound was performed with complete evaluation of the four quadrants, retroareolar regions, and axillae.COMPARISON: 8/14/2024.FINDINGS:Right breast:12 o'clock position 1 cm from the nipple measuring 0.9 x 0.6 x 0.2 cm. Cyst.Left breast:7 o'clock position 3 cm from the nipple measuring 0.3 x 0.4 x 0.3 cm. Complicated cyst.8 o'clock position 11 cm from the nipple measuring 1.1 x 1.3 x 0.9 cm. Hypoechoic mass extending from the skin inferiorly. Mildly increased in size.8 o'clock position 11 cm from the nipple measuring 0.7 x 0.9 x 0.5 cm. Intradermal lesion extending inferiorly. Mildly increased in size.No suspicious solid or complex cystic mass is detected in either breast. No morphologically abnormal axillary lymph nodes are detected.IMPRESSION: Intradermal lesions identified in the left breast at the 8 o'clock position appear mildly increased in size versus differences in technique. Additional finding at the 7 o'clock position 3 cm from the nipple is not significantly changed. Continued follow-up is recommended in 6 months.FOLLOW-UP: Follow-up imaging in 6 months.ASSESSMENT: BI-RADS Category 3: Probably benign. VitalsVital Signs Recorded: [id number] ft 5 [id number] lb BMI Vgaipbwrpc77.95 kg/m2BSA Calculated1.89 c5Dyrua Snal303Oygzabhr ExamGeneral Appearance: A/Ox3; NAD. appears comfortable. Respiratory: airway patent, no use of accessory muscles. Breast:. nodular mass, inferior medial border L breast, no acute infection or tenderness, no masses to R breast. Skin:. no rash or lesion. Neurologic: alert,oriented to person,oriented to placeandoriented to time. Psychiatric: calm. AssessmentLeft breast mass (611.72) (N63.20)IMP: 53 yo F w nodular mass of L breast, inferior medial border.           PlanLeft breast mass Surgery Booking Form Outpatient    .    Status: Need Information - Required information Requested for: [id number] the patient receive COVID 19 vaccine? : NoERP (Enhanced Recovery Program) : NoDiabetic : NMedical Evaluation : NoPST Triage Evaluation : Surgeon H&PAdmission Type : OPAnesthesia Alert : NAnesthesia Type : IVASLaterality : LeftLength of Procedure : 30 minProcedure Description: : excision of nodular mass, left breastplan for surgical excision of L breast nodular mass, @ [facility], outpatient. procedure indicated for diagnostic evaluation, symptomatic relief and prevention of complications. Risks discussed w patient, including bleeding, infection, scarring and need for further intervention depending on pathology results. Benefits include definitive diagnosis, symptom resolution and reduced risk of further growth. Patient advised on NPO status. RTO postop for wound check and pathology review. End of VisitTime Based Billing:. I have spent 30 minutes of time on the encounter which excludes teaching and separately reported services.  Electronically signed by : WILLIE ROBIN MD; May 15 2025 10:02AM Eastern Standard Time (Author)

## 2025-06-02 NOTE — PRE PROCEDURE NOTE - NSPROCASSESMENT_GEN_ALL_CORE
History and Physical referenced above reviewed by pre-surgical testing and anaesthesia and compiled here for day of surgery review and attestation by surgical team.

## 2025-06-04 ENCOUNTER — OUTPATIENT (OUTPATIENT)
Dept: OUTPATIENT SERVICES | Facility: HOSPITAL | Age: 55
LOS: 1 days | End: 2025-06-04
Payer: COMMERCIAL

## 2025-06-04 ENCOUNTER — TRANSCRIPTION ENCOUNTER (OUTPATIENT)
Age: 55
End: 2025-06-04

## 2025-06-04 ENCOUNTER — APPOINTMENT (OUTPATIENT)
Dept: SURGERY | Facility: HOSPITAL | Age: 55
End: 2025-06-04

## 2025-06-04 VITALS
HEIGHT: 65 IN | WEIGHT: 182.98 LBS | TEMPERATURE: 98 F | DIASTOLIC BLOOD PRESSURE: 79 MMHG | RESPIRATION RATE: 16 BRPM | HEART RATE: 101 BPM | OXYGEN SATURATION: 100 % | SYSTOLIC BLOOD PRESSURE: 147 MMHG

## 2025-06-04 VITALS
RESPIRATION RATE: 16 BRPM | OXYGEN SATURATION: 95 % | HEART RATE: 82 BPM | TEMPERATURE: 98 F | DIASTOLIC BLOOD PRESSURE: 62 MMHG | SYSTOLIC BLOOD PRESSURE: 106 MMHG

## 2025-06-04 DIAGNOSIS — N63.20 UNSPECIFIED LUMP IN THE LEFT BREAST, UNSPECIFIED QUADRANT: ICD-10-CM

## 2025-06-04 DIAGNOSIS — Z98.890 OTHER SPECIFIED POSTPROCEDURAL STATES: Chronic | ICD-10-CM

## 2025-06-04 LAB — HCG UR QL: NEGATIVE — SIGNIFICANT CHANGE UP

## 2025-06-04 PROCEDURE — 19120 REMOVAL OF BREAST LESION: CPT | Mod: LT

## 2025-06-04 RX ORDER — ONDANSETRON HCL/PF 4 MG/2 ML
4 VIAL (ML) INJECTION ONCE
Refills: 0 | Status: DISCONTINUED | OUTPATIENT
Start: 2025-06-04 | End: 2025-06-04

## 2025-06-04 RX ORDER — FENTANYL CITRATE-0.9 % NACL/PF 100MCG/2ML
50 SYRINGE (ML) INTRAVENOUS
Refills: 0 | Status: DISCONTINUED | OUTPATIENT
Start: 2025-06-04 | End: 2025-06-04

## 2025-06-04 RX ORDER — LOSARTAN POTASSIUM 100 MG/1
1 TABLET, FILM COATED ORAL
Refills: 0 | DISCHARGE

## 2025-06-04 RX ORDER — FENTANYL CITRATE-0.9 % NACL/PF 100MCG/2ML
25 SYRINGE (ML) INTRAVENOUS
Refills: 0 | Status: DISCONTINUED | OUTPATIENT
Start: 2025-06-04 | End: 2025-06-04

## 2025-06-04 RX ORDER — METOPROLOL SUCCINATE 50 MG/1
1 TABLET, EXTENDED RELEASE ORAL
Refills: 0 | DISCHARGE

## 2025-06-04 RX ORDER — SODIUM CHLORIDE 9 G/1000ML
1000 INJECTION, SOLUTION INTRAVENOUS
Refills: 0 | Status: DISCONTINUED | OUTPATIENT
Start: 2025-06-04 | End: 2025-06-04

## 2025-06-04 RX ORDER — AMLODIPINE BESYLATE 10 MG/1
1 TABLET ORAL
Refills: 0 | DISCHARGE

## 2025-06-04 RX ADMIN — Medication 1 APPLICATION(S): at 06:33

## 2025-06-04 NOTE — ASU DISCHARGE PLAN (ADULT/PEDIATRIC) - NS MD DC FALL RISK RISK
For information on Fall & Injury Prevention, visit: [email or web address] OR  [email or web address] OR  https://www.cdc.gov/steadi/patient.html

## 2025-06-04 NOTE — BRIEF OPERATIVE NOTE - VENOUS THROMBOEMBOLISM PROPHYLAXIS THERAPY
SCDs Detail Level: Detailed X Size Of Lesion In Cm (Optional): 3 Size Of Lesion: 1 X Size Of Lesion In Cm (Optional): 0.5

## 2025-06-04 NOTE — ASU DISCHARGE PLAN (ADULT/PEDIATRIC) - FINANCIAL ASSISTANCE
[facility] provides services at a reduced cost to those who are determined to be eligible through [facility]’s financial assistance program. Information regarding [facility]’s financial assistance program can be found by going to [email or web address] or by calling [phone].

## 2025-06-04 NOTE — ASU PATIENT PROFILE, ADULT - FALL HARM RISK - UNIVERSAL INTERVENTIONS
Left detail message to call back so we can schedule appt for pt.    Bed in lowest position, wheels locked, appropriate side rails in place/Call bell, personal items and telephone in reach/Instruct patient to call for assistance before getting out of bed or chair/Non-slip footwear when patient is out of bed/[place] to call system/Physically safe environment - no spills, clutter or unnecessary equipment/Purposeful Proactive Rounding/Room/bathroom lighting operational, light cord in reach

## 2025-06-04 NOTE — ASU DISCHARGE PLAN (ADULT/PEDIATRIC) - CARE PROVIDER_API CALL
[address] Patient Status:  Inpatient    1946 MRN [id number]   [facility] 4NW-A Attending Aspen Arreguin, [address] [facility] Day # 8 PCP Roseanne Torres MD       Date of Procedure: 10/15/18      Pre-operative Diagnosis: rig to pleuravac drainage system to water seal. An occlusive sterile gauze dressing was then applied to the chest tube site. In total, 450mL of serosanguinous fluid was drained at the completion of the procedure with no visualized air leak.   Fluid was sent for [facility].  Surgery  [address] [facility], [address]  [place] [address]  Phone: [phone]  Fax: [phone]  Follow Up Time:

## 2025-06-04 NOTE — ASU PREOP CHECKLIST - TEMPERATURE IN FAHRENHEIT (DEGREES F)
Problem: Discharge Planning  Goal: Discharge to home or other facility with appropriate resources  Outcome: Progressing     Problem: Safety - Adult  Goal: Free from fall injury  Outcome: Progressing     Problem: ABCDS Injury Assessment  Goal: Absence of physical injury  Outcome: Progressing      98.5

## 2025-06-05 ENCOUNTER — RESULT REVIEW (OUTPATIENT)
Age: 55
End: 2025-06-05

## 2025-06-05 PROCEDURE — 88305 TISSUE EXAM BY PATHOLOGIST: CPT | Mod: 26

## 2025-06-05 PROCEDURE — 88305 TISSUE EXAM BY PATHOLOGIST: CPT

## 2025-06-05 PROCEDURE — 11404 EXC TR-EXT B9+MARG 3.1-4 CM: CPT

## 2025-06-05 PROCEDURE — 81025 URINE PREGNANCY TEST: CPT

## 2025-06-05 PROCEDURE — 12032 INTMD RPR S/A/T/EXT 2.6-7.5: CPT

## 2025-06-10 LAB — SURGICAL PATHOLOGY STUDY: SIGNIFICANT CHANGE UP

## 2025-06-16 ENCOUNTER — APPOINTMENT (OUTPATIENT)
Dept: SURGERY | Facility: CLINIC | Age: 55
End: 2025-06-16
Payer: COMMERCIAL

## 2025-06-16 PROCEDURE — 99024 POSTOP FOLLOW-UP VISIT: CPT

## (undated) DEVICE — DRAPE LAPAROTOMY TRANSVERSE

## (undated) DEVICE — ELCTR GROUNDING PAD ADULT COVIDIEN

## (undated) DEVICE — DRSG MASTISOL

## (undated) DEVICE — SUT SOFSILK 2-0 30" V-20

## (undated) DEVICE — DRSG CURITY GAUZE SPONGE 4 X 4" 12-PLY

## (undated) DEVICE — DRSG TEGADERM 4 X 4.75"

## (undated) DEVICE — DRSG STERISTRIPS 0.5 X 4"

## (undated) DEVICE — DRAPE LIGHT HANDLE COVER (BLUE)

## (undated) DEVICE — VENODYNE/SCD SLEEVE CALF MEDIUM

## (undated) DEVICE — SUT POLYSORB 4-0 27" P-12 UNDYED

## (undated) DEVICE — GLV 7 PROTEXIS (BLUE)

## (undated) DEVICE — WARMING BLANKET LOWER ADULT

## (undated) DEVICE — GLV 7 PROTEXIS (WHITE)

## (undated) DEVICE — PACK MINOR NO DRAPE

## (undated) DEVICE — SUT POLYSORB 3-0 30" V-20 UNDYED

## (undated) DEVICE — SOL IRR POUR H2O 1500ML